# Patient Record
Sex: FEMALE | URBAN - METROPOLITAN AREA
[De-identification: names, ages, dates, MRNs, and addresses within clinical notes are randomized per-mention and may not be internally consistent; named-entity substitution may affect disease eponyms.]

---

## 2024-01-17 ENCOUNTER — OFFICE VISIT (OUTPATIENT)
Dept: URBAN - METROPOLITAN AREA CLINIC 94 | Facility: CLINIC | Age: 39
End: 2024-01-17
Payer: COMMERCIAL

## 2024-01-17 ENCOUNTER — DASHBOARD ENCOUNTERS (OUTPATIENT)
Age: 39
End: 2024-01-17

## 2024-01-17 VITALS
BODY MASS INDEX: 43.35 KG/M2 | DIASTOLIC BLOOD PRESSURE: 81 MMHG | HEIGHT: 68 IN | SYSTOLIC BLOOD PRESSURE: 154 MMHG | TEMPERATURE: 97 F | HEART RATE: 67 BPM | WEIGHT: 286 LBS

## 2024-01-17 DIAGNOSIS — K51.30 ULCERATIVE RECTOSIGMOIDITIS WITHOUT COMPLICATION: ICD-10-CM

## 2024-01-17 DIAGNOSIS — D84.9 IMMUNOCOMPROMISED: ICD-10-CM

## 2024-01-17 PROBLEM — 370388006: Status: ACTIVE | Noted: 2024-01-17

## 2024-01-17 PROBLEM — 41364008: Status: ACTIVE | Noted: 2024-01-17

## 2024-01-17 PROCEDURE — 99245 OFF/OP CONSLTJ NEW/EST HI 55: CPT | Performed by: INTERNAL MEDICINE

## 2024-01-17 PROCEDURE — 99205 OFFICE O/P NEW HI 60 MIN: CPT | Performed by: INTERNAL MEDICINE

## 2024-01-17 RX ORDER — ESCITALOPRAM OXALATE 10 MG/1
1 TABLET TABLET, FILM COATED ORAL ONCE A DAY
Status: ACTIVE | COMMUNITY

## 2024-01-17 RX ORDER — CHOLECALCIFEROL (VITAMIN D3) 50 MCG
1 TABLET TABLET ORAL ONCE A DAY
Status: ACTIVE | COMMUNITY

## 2024-01-17 RX ORDER — VEDOLIZUMAB 300 MG/5ML
AS DIRECTED INJECTION, POWDER, LYOPHILIZED, FOR SOLUTION INTRAVENOUS
Status: ACTIVE | COMMUNITY

## 2024-01-17 RX ORDER — AZATHIOPRINE 50 MG/1
AS DIRECTED TABLET ORAL
Status: ACTIVE | COMMUNITY

## 2024-01-17 RX ORDER — LORAZEPAM 1 MG/1
1 TABLET AT BEDTIME AS NEEDED TABLET ORAL ONCE A DAY
Status: ACTIVE | COMMUNITY

## 2024-01-17 RX ORDER — MECOBALAMIN 5000 MCG
AS DIRECTED LOZENGE ORAL
Status: ACTIVE | COMMUNITY

## 2024-01-17 NOTE — HPI-TODAY'S VISIT:
39 y/o F referred here for ulcerative colitis by Dr Charline Cabezas for 2nd opinion. A copy of this document will be sent to the referring provider  Patient reports being diagnosed with UC ~10-15 years ago. Has failed remicade, humira, stelera over the years. Was then placed on imuran and reportedly doing okay, however reports having a flare requiring hospitalization in summer 2023. Calprotectin 1840 in July with colonoscopy confirming severe rectosigmoiditis. Envyio started while imuran continued at 200mg, though patient reports she self switched to 100mg Today patient reports doing much better with daily BM. Most recent calprotectin 586 in October 2023. However mention having joint pains. Has seen rheum before  CT a/p 08/2023: No acute inflammation Colonoscopy 07/2023: severe UC upto sigmoid colon. Biopsies confirming UC

## 2024-01-18 LAB
A/G RATIO: 1.8
ALBUMIN: 4.1
ALKALINE PHOSPHATASE: 83
ALT (SGPT): 12
AST (SGOT): 18
BILIRUBIN, TOTAL: 0.4
BUN/CREATININE RATIO: (no result)
BUN: 13
C-REACTIVE PROTEIN, QUANT: 8.8
CALCIUM: 9.5
CARBON DIOXIDE, TOTAL: 23
CHLORIDE: 106
CREATININE: 0.86
EGFR: 88
GLOBULIN, TOTAL: 2.3
GLUCOSE: 82
HEMATOCRIT: 40.2
HEMOGLOBIN: 13.6
MCH: 30.6
MCHC: 33.8
MCV: 90.3
MPV: 10.4
PLATELET COUNT: 383
POTASSIUM: 4.4
PROTEIN, TOTAL: 6.4
RDW: 14.3
RED BLOOD CELL COUNT: 4.45
SED RATE BY MODIFIED: 9
SODIUM: 139
WHITE BLOOD CELL COUNT: 9.4